# Patient Record
Sex: MALE | Race: WHITE
[De-identification: names, ages, dates, MRNs, and addresses within clinical notes are randomized per-mention and may not be internally consistent; named-entity substitution may affect disease eponyms.]

---

## 2019-11-08 ENCOUNTER — HOSPITAL ENCOUNTER (INPATIENT)
Dept: HOSPITAL 74 - YASAS | Age: 54
LOS: 5 days | Discharge: OTHER INSTITUTION | DRG: 773 | End: 2019-11-13
Attending: ALLERGY & IMMUNOLOGY | Admitting: ALLERGY & IMMUNOLOGY
Payer: COMMERCIAL

## 2019-11-08 VITALS — BODY MASS INDEX: 23.1 KG/M2

## 2019-11-08 DIAGNOSIS — F19.24: ICD-10-CM

## 2019-11-08 DIAGNOSIS — F11.20: ICD-10-CM

## 2019-11-08 DIAGNOSIS — F17.213: ICD-10-CM

## 2019-11-08 DIAGNOSIS — F14.90: ICD-10-CM

## 2019-11-08 DIAGNOSIS — Z79.4: ICD-10-CM

## 2019-11-08 DIAGNOSIS — G89.29: ICD-10-CM

## 2019-11-08 DIAGNOSIS — M54.5: ICD-10-CM

## 2019-11-08 DIAGNOSIS — G47.00: ICD-10-CM

## 2019-11-08 DIAGNOSIS — E78.00: ICD-10-CM

## 2019-11-08 DIAGNOSIS — F12.10: ICD-10-CM

## 2019-11-08 DIAGNOSIS — Z79.84: ICD-10-CM

## 2019-11-08 DIAGNOSIS — R00.0: ICD-10-CM

## 2019-11-08 DIAGNOSIS — F10.230: Primary | ICD-10-CM

## 2019-11-08 DIAGNOSIS — E11.9: ICD-10-CM

## 2019-11-08 PROCEDURE — HZ2ZZZZ DETOXIFICATION SERVICES FOR SUBSTANCE ABUSE TREATMENT: ICD-10-PCS | Performed by: ALLERGY & IMMUNOLOGY

## 2019-11-08 RX ADMIN — ASPIRIN 81 MG SCH MG: 81 TABLET ORAL at 19:31

## 2019-11-08 RX ADMIN — INSULIN ASPART SCH: 100 INJECTION, SOLUTION INTRAVENOUS; SUBCUTANEOUS at 19:31

## 2019-11-08 RX ADMIN — INSULIN ASPART SCH: 100 INJECTION, SOLUTION INTRAVENOUS; SUBCUTANEOUS at 22:14

## 2019-11-08 RX ADMIN — ATORVASTATIN CALCIUM SCH MG: 80 TABLET, FILM COATED ORAL at 22:14

## 2019-11-08 RX ADMIN — METFORMIN HYDROCHLORIDE SCH MG: 500 TABLET ORAL at 19:31

## 2019-11-08 RX ADMIN — INSULIN DETEMIR SCH: 100 INJECTION, SOLUTION SUBCUTANEOUS at 22:17

## 2019-11-08 RX ADMIN — Medication SCH MG: at 22:14

## 2019-11-08 NOTE — HP
CIWA Score


Nausea/Vomitin


Muscle Tremors: 2


Anxiety: 4-Mod. Anxious/Guarded


Agitation: 4-Moderately Restless


Paroxysmal Sweats: 2


Orientation: 0-Oriented


Tacttile Disturbances: 0-None


Auditory Disturbances: 0-None


Visual Disturbances: 0-None


Headache: 0-None Present


CIWA-Ar Total Score: 14





- Admission Criteria


[facility] Guidelines: Admission for Medically Managed Detox: 


Requires at least one of the followin. CIWA greater than 12


2. Seizures within the past 24 hours


3. Delirium tremens within the past 24 hours


4. Hallucinations within the past 24 hours


5. Acute intervention needed for co  occurring medical disorder


6. Acute intervention needed for co  occurring psychiatric disorder


7. Severe withdrawal that cannot be handled at a lower level of care (continued


    vomiting, continued diarrhea, abnormal vital signs) requiring intravenous


    medication and/or fluids


8. Pregnancy








Admission ROS S





- HPI


Allergies/Adverse Reactions: 


 Allergies











Allergy/AdvReac Type Severity Reaction Status Date / Time


 


No Known Allergies Allergy   Verified 19 14:06











History of Present Illness: 





pt here requesting detox from etoh use  , reports 2  pints/day , 12-18 beers/

day , starts drinking in the morning , latest use today , started 17 mo ago  2/

2 family issues.  


heroin : relapsed 17  mo ago, latest  use  1 week ago , was started  on 

Suboxone yesterday  . denies IV use   currently  used  IV  in the 's and  

[id number]


cocaine : 1 gr  q 2  days  denies iv use 


cannabis : 1/8  every 3  days  


tobacco :  1  ppd  


pmhx :DM  , denies Hep C  / HIV  


shx :  unemployed, lives alone  , denies  legal issues 








 This report was requested by: Roula Valdez | Reference #: [id number]


Others' Prescriptions


Patient Name:    Alexis hSen    YOB: 1965


Address:    [address] [place] [address]    Sex:    Male


Rx Written    Rx Dispensed    Drug    Quantity    Days Supply    Prescriber Name


2019    buprenorphine-naloxone 8-2 mg sl film    24    8    

Mary Warner (NP)


10/17/2019    10/17/2019    zolpidem tartrate 10 mg tablet    28    28    Salop

, Shanyq, G


10/17/2019    10/17/2019    clonazepam 1 mg tablet    28    28    Salop, Shanyq

, G


2019    zolpidem tartrate 10 mg tablet    28    28    

MishaHector (NP)


2019    clonazepam 1 mg tablet    28    28    [facility] 

Hector BYRNE (NP)


2019    zolpidem tartrate 10 mg tablet    28    28    

[facility]


2019    [facility]on[facility] 1 mg tablet    28    [facility]


2019    05/15/2019    zolpidem tartrate 10 mg tablet    28    28    

Mariia Telles MD


2019    clonazepam 1 mg tablet    28    28    Mariia Telles MD


2019    04/15/2019    zolpidem tartrate 10 mg tablet    30    30    

MishaHector (NP)


2019    clonazepam 0.5 mg tablet    28    28    [facility]


03/15/2019    03/15/2019    zolpidem tartrate 10 mg tablet    30    30    

TigenogenaroHector (NP)


2019    zolpidem tartrate 10 mg tablet    30    30    

Mariia Telles MD


01/15/2019    01/15/2019    zolpidem tartrate 10 mg tablet    30    30    

Mariia Telles MD


2018    zolpidem tartrate 10 mg tablet    30    30    

Mariia Telles MD





Patient Name:    Alexis Shen Jr    YOB: 1965


Address:    [address] [place] [address]    Sex:    Male


Rx Written    Rx Dispensed    Drug    Quantity    Days Supply    Prescriber Name


2019    oxycodone-acetaminophen 5-325 mg tab    56    7    

Aris Douglas


2019    oxycodone-acetaminophen 5-325 mg tab    56    8    

Aris Douglas


2019    oxycodone-acetaminophen 5-325 mg tab    21    7    

Aris Douglas


2019    oxycodone-acetaminophen  mg tab    45    15 

   Aris Douglas


2019    oxycodone-acetaminophen  mg tab    45    15 

   Aris Douglsa


2019    oxycodone-acetaminophen 5-325 mg tab    21    7    

Aris Douglas


2018    oxycodone-acetaminophen  mg tab    45    15 

   Aris Douglas


2018    oxycodone-acetaminophen  mg tab    21    7    

Aris Douglas


Exam Limitations: Clinical Condition





- Ebola screening


Have you traveled outside of the country in the last 21 days: No (N)


Have you had contact with anyone from an Ebola affected area: No


Do you have a fever: No





- Review of Systems


Constitutional: See HPI, Loss of Appetite


EENT: reports: Other (reading glasses)


Respiratory: reports: No Symptoms reported


Cardiac: reports: No Symptoms Reported


GI: reports: See HPI, Constipated, Nausea, Poor Appetite


: reports: No Symptoms Reported


Musculoskeletal: reports: Back Pain (chronic  - used to have rx for Perocet " 

it was for my SSI "), Joint Pain (erik knees  , feet  chronic pain)


Integumentary: reports: No Symptoms Reported


Neuro: reports: Numbness (DM neuropathy), Unsteady Gait (using cane)


Endocrine: reports: See HPI


Psychiatric: reports: Orientated x3, Agitated, Anxious





Patient History





- Smoking Cessation


Smoking history: Current every day smoker


Have you smoked in the past 12 months: Yes


Hx Chewing Tobacco Use: No


Initiated information on smoking cessation: Yes


'Breaking Loose' booklet given: 19





- Substances abused


  ** Heroin


Substance route: Inhalation


Frequency: Daily


Amount used: 2BUNDLES


Age of first use: 20


Date of last use: 19





  ** Crack


Substance route: Smoking


Frequency: Daily


Amount used: 2BUNDLES


Age of first use: 22


Date of last use: 19





  ** Alcohol


Substance route: Oral


Frequency: Daily


Amount used: BEERS- 12PCK/ BACARDY- 1L


Age of first use: 10


Date of last use: 19





Admission Physical Exam [facility]





- Vital Signs


Vital Signs: 


 Vital Signs - 24 hr











  19





  14:11


 


Temperature 97.9 F


 


Pulse Rate 123 H


 


Respiratory 20





Rate 


 


Blood Pressure 163/96














- Physical


General Appearance: Yes: Mild Distress, Anxious


HEENTM: Yes: EOMI, Hearing grossly Normal, Normocephalic, Normal Voice


Respiratory: Yes: Chest Non-Tender, Lungs Clear, Normal Breath Sounds, No 

Respiratory Distress, No Accessory Muscle Use


Neck: Yes: No masses,lesions,Nodules, Trachea in good position


Cardiology: Yes: Regular Rhythm, Regular Rate, S1, S2, Tachycardia


Abdominal: Yes: Non Tender, Soft


Musculoskeletal: Yes: Back pain


Extremities: Yes: Normal Range of Motion, Non-Tender, Tremors


Neurological: Yes: Fully Oriented, Alert, Motor Strength 5/5


Integumentary: Yes: Warm, Track Marks





- Diagnostic


(1) Opioid dependence on agonist therapy


Current Visit: Yes   Status: Acute   





(2) Alcohol use disorder


Current Visit: Yes   Status: Chronic   





(3) Nicotine dependence


Current Visit: Yes   Status: Chronic   


Qualifiers: 


   Nicotine product type: cigarettes 





(4) Cocaine use


Current Visit: Yes   Status: Chronic   





(5) Cannabis use disorder, mild, abuse


Current Visit: Yes   Status: Chronic   





Breathalyzer





- Breathalyzer


Breathalyzer: 0





Urine Drug Screen





- Test Device


Lot number: [id number]


Expiration date: 21





- Control


Is test valid?: Yes





- Results


Drug screen NEGATIVE: No


Urine drug screen results: THC-Marijuana, BRANDY-Cocaine, BUP-Suboxone





Inpatient Rehab Admission





- Rehab Decision to Admit


Inpatient rehab admission?: No

## 2019-11-09 LAB
ALBUMIN SERPL-MCNC: 3.6 G/DL (ref 3.4–5)
ALP SERPL-CCNC: 72 U/L (ref 45–117)
ALT SERPL-CCNC: 14 U/L (ref 13–61)
ANION GAP SERPL CALC-SCNC: 6 MMOL/L (ref 8–16)
AST SERPL-CCNC: 14 U/L (ref 15–37)
BILIRUB SERPL-MCNC: 0.4 MG/DL (ref 0.2–1)
BUN SERPL-MCNC: 19.4 MG/DL (ref 7–18)
CALCIUM SERPL-MCNC: 9 MG/DL (ref 8.5–10.1)
CHLORIDE SERPL-SCNC: 102 MMOL/L (ref 98–107)
CO2 SERPL-SCNC: 34 MMOL/L (ref 21–32)
CREAT SERPL-MCNC: 0.8 MG/DL (ref 0.55–1.3)
DEPRECATED RDW RBC AUTO: 13.6 % (ref 11.9–15.9)
GLUCOSE SERPL-MCNC: 139 MG/DL (ref 74–106)
HCT VFR BLD CALC: 40.9 % (ref 35.4–49)
HGB BLD-MCNC: 13.7 GM/DL (ref 11.7–16.9)
MCH RBC QN AUTO: 30.4 PG (ref 25.7–33.7)
MCHC RBC AUTO-ENTMCNC: 33.6 G/DL (ref 32–35.9)
MCV RBC: 90.4 FL (ref 80–96)
PLATELET # BLD AUTO: 185 K/MM3 (ref 134–434)
PMV BLD: 8.3 FL (ref 7.5–11.1)
POTASSIUM SERPLBLD-SCNC: 3.8 MMOL/L (ref 3.5–5.1)
PROT SERPL-MCNC: 6.9 G/DL (ref 6.4–8.2)
RBC # BLD AUTO: 4.52 M/MM3 (ref 4–5.6)
SODIUM SERPL-SCNC: 142 MMOL/L (ref 136–145)
WBC # BLD AUTO: 9.6 K/MM3 (ref 4–10)

## 2019-11-09 RX ADMIN — INSULIN ASPART SCH: 100 INJECTION, SOLUTION INTRAVENOUS; SUBCUTANEOUS at 06:49

## 2019-11-09 RX ADMIN — FLUOXETINE SCH MG: 10 TABLET, FILM COATED ORAL at 13:09

## 2019-11-09 RX ADMIN — ATORVASTATIN CALCIUM SCH MG: 80 TABLET, FILM COATED ORAL at 22:28

## 2019-11-09 RX ADMIN — INSULIN ASPART SCH: 100 INJECTION, SOLUTION INTRAVENOUS; SUBCUTANEOUS at 16:58

## 2019-11-09 RX ADMIN — Medication PRN MG: at 22:29

## 2019-11-09 RX ADMIN — Medication SCH TAB: at 10:58

## 2019-11-09 RX ADMIN — GLIPIZIDE SCH MG: 5 TABLET, EXTENDED RELEASE ORAL at 07:06

## 2019-11-09 RX ADMIN — Medication SCH MG: at 22:28

## 2019-11-09 RX ADMIN — INSULIN DETEMIR SCH: 100 INJECTION, SOLUTION SUBCUTANEOUS at 22:49

## 2019-11-09 RX ADMIN — METFORMIN HYDROCHLORIDE SCH MG: 500 TABLET ORAL at 07:06

## 2019-11-09 RX ADMIN — INSULIN ASPART SCH: 100 INJECTION, SOLUTION INTRAVENOUS; SUBCUTANEOUS at 11:04

## 2019-11-09 RX ADMIN — BUPRENORPHINE HYDROCHLORIDE, NALOXONE HYDROCHLORIDE SCH EACH: 8; 2 FILM, SOLUBLE BUCCAL; SUBLINGUAL at 10:59

## 2019-11-09 RX ADMIN — ARIPIPRAZOLE SCH MG: 5 TABLET ORAL at 13:10

## 2019-11-09 RX ADMIN — ASPIRIN 81 MG SCH MG: 81 TABLET ORAL at 10:58

## 2019-11-09 RX ADMIN — INSULIN ASPART SCH: 100 INJECTION, SOLUTION INTRAVENOUS; SUBCUTANEOUS at 22:49

## 2019-11-09 NOTE — CONSULT
[facility] Psychiatric Consult





- Data


Date of interview: 11/09/19


Admission source: [facility]


Identifying data: First admission to [facility] for this 53 y/o  male 

self-referred for detoxification (SOLITARIO issues : alcohol, cannabis, nicotine, 

cocaine). Interviewed at [address] [place] [address]. Patient is , father of one, domiciled

, unemployed and supported on SSI benefits.


Substance Abuse History: Discussed with the patient. Details in current [facility]HS 

report as follows : Smoking history: Current every day smoker.  Have you smoked 

in the past 12 months: Yes.  Hx Chewing Tobacco Use: No.  Initiated information 

on smoking cessation: Yes.  'Breaking Loose' booklet given: 11/08/19.  - 

Substances abused.  ** Heroin.  Substance route: Inhalation.  Frequency: Daily.

  Amount used: 2BUNDLES.  Age of first use: 20.  Date of last use: 11/01/19.  *

* Crack.  Substance route: Smoking.  Frequency: Daily.  Amount used: 2BUNDLES.  

Age of first use: 22.  Date of last use: 11/07/19.  ** Alcohol.  Substance route

: Oral.  Frequency: Daily.  Amount used: BEERS- 12PCK/ BACARDY- 1L.  Age of 

first use: 10.  Date of last use: 11/08/19


Medical History: Medical profile is consistent with diabetes mellitus, 

hypercholesterolemia, chronic lumbar pain and history of surgeries (right 

inguinal herniorraphy + left wrist surgery).


Psychiatric History: Patient denies history of psychiatric hospitalizations. He 

indicates that he has been diagnosed with " bipolar disorder and schizophrenia 

". Mr Shen sees a psychiatrist at [facility] in the [place] 

for his OPD care. Medicated with prozac + zolpidem + aripriprazole. Currently 

on suboxone maintenance. No reported history of suicide attempts.


Physical/Sexual Abuse/Trauma History: None reported.


Additional Comment: Urine drug screen results: THC-Marijuana, BRANDY-Cocaine, BUP-

Suboxone. Noted.





Mental Status Exam





- Mental Status Exam


Alert and Oriented to: Time, Place, Person


Cognitive Function: Good


Patient Appearance: Well Groomed


Mood: Hopeful


Affect: Appropriate, Normal Range


Patient Behavior: Fatigued, Appropriate, Cooperative


Speech Pattern: Clear, Appropriate


Thought Process: Intact, Goal Oriented


Thought Disorder: Not Present


Hallucinations: Denies


Suicidal Ideation: Denies


Homicidal Ideation: Denies


Insight/Judgement: Poor


Sleep: Poorly, Difficulty falling asleep


Appetite: Good


Muscle strength/Tone: Normal


Gait/Station: Normal





Psychiatric Findings





- Problem List (Axis 1, 2,3)


(1) Alcohol use disorder


Current Visit: Yes   Status: Chronic   





(2) Opioid dependence on agonist therapy


Current Visit: Yes   Status: Chronic   





(3) Cannabis use disorder, mild, abuse


Current Visit: Yes   Status: Chronic   





(4) Cocaine use


Current Visit: Yes   Status: Chronic   





(5) Nicotine dependence


Current Visit: Yes   Status: Chronic   


Qualifiers: 


   Nicotine product type: cigarettes 





(6) Substance induced mood disorder


Current Visit: Yes   Status: Chronic   





(7) History of schizoaffective disorder


Current Visit: Yes   Status: Chronic   





(8) Insomnia


Current Visit: Yes   Status: Chronic   





- Initial Treatment Plan


Initial Treatment Plan: Psychoeducation. Sleep hygiene. Detoxification. AA/NA 

meetings. MAT services discussed in this session. Groups. Medications resumed 

as : prozac 10 mg po daily + abilify 5 mg po daily. Insomnia is addressed with 

melatonin at bedtime. Side effects/benefits of each formulation are discussed 

with patient. Gave verbal consent to this plan of care. Observation.

## 2019-11-09 NOTE — PN
S CIWA





- CIWA Score


Nausea/Vomitin-No Nausea/No Vomiting


Muscle Tremors: 2


Anxiety: 3


Agitation: 0-Normal Activity


Paroxysmal Sweats: 3


Orientation: 0-Oriented


Tacttile Disturbances: 0-None


Auditory Disturbances: 0-None


Visual Disturbances: 0-None


Headache: 1-Very Mild


CIWA-Ar Total Score: 9





B[facility] Progress Note (SOAP)


Subjective: 





c/o headache, anxiety, and sweats.


Objective: 





19 13:35


 Vital Signs











  19





  06:16 09:38 11:07


 


Temperature 97 F L 96.5 F L 


 


Pulse Rate 76 76 85


 


Respiratory 18 18 





Rate   


 


Blood Pressure 93/60 95/55 L 112/64














  19





  13:19


 


Temperature 97.2 F L


 


Pulse Rate 83


 


Respiratory 18





Rate 


 


Blood Pressure 102/62








 Lab Results











WBC  9.6 K/mm3 (4.0-10.0)   19  07:50    


 


RBC  4.52 M/mm3 (4.00-5.60)   19  07:50    


 


Hgb  13.7 GM/dL (11.7-16.9)   19  07:50    


 


Hct  40.9 % (35.4-49)   19  07:50    


 


MCV  90.4 fl (80-96)   19  07:50    


 


MCHC  33.6 g/dl (32.0-35.9)   19  07:50    


 


RDW  13.6 % (11.9-15.9)   19  07:50    


 


Plt Count  185 K/MM3 (134-434)   19  07:50    


 


Sodium  142 mmol/L (136-145)   19  07:50    


 


Potassium  3.8 mmol/L (3.5-5.1)   19  07:50    


 


Chloride  102 mmol/L ()   19  07:50    


 


Carbon Dioxide  34 mmol/L (21-32)  H  19  07:50    


 


Anion Gap  6 MMOL/L (8-16)  L  19  07:50    


 


BUN  19.4 mg/dL (7-18)  H  19  07:50    


 


Creatinine  0.8 mg/dL (0.55-1.3)   19  07:50    


 


Random Glucose  139 mg/dL ()  H  19  07:50    


 


Calcium  9.0 mg/dL (8.5-10.1)   19  07:50    








Labs noted.


Assessment: 





19 13:35


AOX3, in no acute respiratory distress.


Full rom, ambulating in the unit.


Withdrawal symptoms.


Plan: 





continue detox.

## 2019-11-10 RX ADMIN — INSULIN ASPART SCH: 100 INJECTION, SOLUTION INTRAVENOUS; SUBCUTANEOUS at 22:16

## 2019-11-10 RX ADMIN — ATORVASTATIN CALCIUM SCH MG: 80 TABLET, FILM COATED ORAL at 22:14

## 2019-11-10 RX ADMIN — INSULIN ASPART SCH: 100 INJECTION, SOLUTION INTRAVENOUS; SUBCUTANEOUS at 06:34

## 2019-11-10 RX ADMIN — INSULIN ASPART SCH: 100 INJECTION, SOLUTION INTRAVENOUS; SUBCUTANEOUS at 12:04

## 2019-11-10 RX ADMIN — METFORMIN HYDROCHLORIDE SCH MG: 500 TABLET ORAL at 06:33

## 2019-11-10 RX ADMIN — GLIPIZIDE SCH MG: 5 TABLET, EXTENDED RELEASE ORAL at 06:34

## 2019-11-10 RX ADMIN — ARIPIPRAZOLE SCH MG: 5 TABLET ORAL at 10:21

## 2019-11-10 RX ADMIN — FLUOXETINE SCH MG: 10 TABLET, FILM COATED ORAL at 10:21

## 2019-11-10 RX ADMIN — ASPIRIN 81 MG SCH MG: 81 TABLET ORAL at 10:20

## 2019-11-10 RX ADMIN — INSULIN DETEMIR SCH: 100 INJECTION, SOLUTION SUBCUTANEOUS at 22:17

## 2019-11-10 RX ADMIN — BUPRENORPHINE HYDROCHLORIDE, NALOXONE HYDROCHLORIDE SCH EACH: 8; 2 FILM, SOLUBLE BUCCAL; SUBLINGUAL at 10:20

## 2019-11-10 RX ADMIN — Medication SCH TAB: at 10:20

## 2019-11-10 RX ADMIN — Medication SCH MG: at 22:14

## 2019-11-10 RX ADMIN — INSULIN ASPART SCH: 100 INJECTION, SOLUTION INTRAVENOUS; SUBCUTANEOUS at 16:43

## 2019-11-10 NOTE — PN
S CIWA





- CIWA Score


Nausea/Vomitin-No Nausea/No Vomiting


Muscle Tremors: 2


Anxiety: 2


Agitation: 2


Paroxysmal Sweats: No Perspiration


Orientation: 0-Oriented


Tacttile Disturbances: 0-None


Auditory Disturbances: 0-None


Visual Disturbances: 0-None


Headache: 0-None Present


CIWA-Ar Total Score: 6





B[facility] Progress Note (SOAP)


Subjective: 





54 years old male admitted on 19 for alcohol withdrawal sx management


treated with librium detox regimen 


taking suboxone 8-2mg po tid last filled 8 days 19


begin suboxone 8-2mg po daily 


patient may return to suboxone provider 


Objective: 





11/10/19 14:06


 Vital Signs











Temperature  99.0 F   11/10/19 13:12


 


Pulse Rate  73   11/10/19 13:12


 


Respiratory Rate  18   11/10/19 13:12


 


Blood Pressure  93/60   11/10/19 13:12


 


O2 Sat by Pulse Oximetry (%)      








 Laboratory Last Values











WBC  9.6 K/mm3 (4.0-10.0)   19  07:50    


 


RBC  4.52 M/mm3 (4.00-5.60)   19  07:50    


 


Hgb  13.7 GM/dL (11.7-16.9)   19  07:50    


 


Hct  40.9 % (35.4-49)   19  07:50    


 


MCV  90.4 fl (80-96)   19  07:50    


 


MCH  30.4 pg (25.7-33.7)   19  07:50    


 


MCHC  33.6 g/dl (32.0-35.9)   19  07:50    


 


RDW  13.6 % (11.9-15.9)   19  07:50    


 


Plt Count  185 K/MM3 (134-434)   19  07:50    


 


MPV  8.3 fl (7.5-11.1)   19  07:50    


 


Sodium  142 mmol/L (136-145)   19  07:50    


 


Potassium  3.8 mmol/L (3.5-5.1)   19  07:50    


 


Chloride  102 mmol/L ()   19  07:50    


 


Carbon Dioxide  34 mmol/L (21-32)  H  19  07:50    


 


Anion Gap  6 MMOL/L (8-16)  L  19  07:50    


 


BUN  19.4 mg/dL (7-18)  H  19  07:50    


 


Creatinine  0.8 mg/dL (0.55-1.3)   19  07:50    


 


Est GFR (CKD-EPI)AfAm  115.74   19  07:50    


 


Est GFR (CKD-EPI)NonAf  99.86   19  07:50    


 


POC Glucometer  126 UNITS ()   11/10/19  12:01    


 


Random Glucose  139 mg/dL ()  H  19  07:50    


 


Calcium  9.0 mg/dL (8.5-10.1)   19  07:50    


 


Total Bilirubin  0.4 mg/dL (0.2-1)   19  07:50    


 


AST  14 U/L (15-37)  L  19  07:50    


 


ALT  14 U/L (13-61)   19  07:50    


 


Alkaline Phosphatase  72 U/L ()   19  07:50    


 


Total Protein  6.9 g/dl (6.4-8.2)   19  07:50    


 


Albumin  3.6 g/dl (3.4-5.0)   19  07:50    


 


RPR Titer  Nonreactive  (NONREACTIVE)   19  07:50    








lab noted 


fasting glucose


Assessment: 





11/10/19 14:07


alcohol withdrawal sx


Plan: 





continue librium detox regimen

## 2019-11-11 RX ADMIN — GLIPIZIDE SCH MG: 5 TABLET, EXTENDED RELEASE ORAL at 06:30

## 2019-11-11 RX ADMIN — ASPIRIN 81 MG SCH MG: 81 TABLET ORAL at 10:11

## 2019-11-11 RX ADMIN — INSULIN ASPART SCH: 100 INJECTION, SOLUTION INTRAVENOUS; SUBCUTANEOUS at 06:31

## 2019-11-11 RX ADMIN — BUPRENORPHINE HYDROCHLORIDE, NALOXONE HYDROCHLORIDE SCH EACH: 8; 2 FILM, SOLUBLE BUCCAL; SUBLINGUAL at 10:11

## 2019-11-11 RX ADMIN — FLUOXETINE SCH MG: 10 TABLET, FILM COATED ORAL at 10:15

## 2019-11-11 RX ADMIN — INSULIN ASPART SCH: 100 INJECTION, SOLUTION INTRAVENOUS; SUBCUTANEOUS at 22:27

## 2019-11-11 RX ADMIN — Medication SCH MG: at 22:29

## 2019-11-11 RX ADMIN — Medication SCH TAB: at 10:11

## 2019-11-11 RX ADMIN — ARIPIPRAZOLE SCH MG: 5 TABLET ORAL at 10:15

## 2019-11-11 RX ADMIN — INSULIN DETEMIR SCH: 100 INJECTION, SOLUTION SUBCUTANEOUS at 22:27

## 2019-11-11 RX ADMIN — METFORMIN HYDROCHLORIDE SCH MG: 500 TABLET ORAL at 06:30

## 2019-11-11 RX ADMIN — INSULIN ASPART SCH: 100 INJECTION, SOLUTION INTRAVENOUS; SUBCUTANEOUS at 11:57

## 2019-11-11 RX ADMIN — INSULIN ASPART SCH: 100 INJECTION, SOLUTION INTRAVENOUS; SUBCUTANEOUS at 17:05

## 2019-11-11 RX ADMIN — ATORVASTATIN CALCIUM SCH MG: 80 TABLET, FILM COATED ORAL at 22:29

## 2019-11-11 NOTE — PN
[facility] Progress Note


Note: 





Psychiatry


Attending's note (follow-up) : 


Patient seen. 


Reason : auditory hallucinations.


Reported by Counselor JARETT Muñiz.


Mr Shen is examined by writer.


" I don't hear voices. This happened a long time ago."


Patient states that he is interested in rehabilitation at [facility].


Feels fine. Adherent to medications. Benign hospital course.


Unremarkable mental status.

## 2019-11-11 NOTE — PN
[facility] CIWA





- CIWA Score


Nausea/Vomitin-No Nausea/No Vomiting


Muscle Tremors: 1-None Visible, but Felt


Anxiety: 1-Mildly Anxious


Agitation: 1-Slight > Activity


Paroxysmal Sweats: No Perspiration


Orientation: 0-Oriented


Tacttile Disturbances: 0-None


Auditory Disturbances: 0-None


Visual Disturbances: 0-None


Headache: 0-None Present


CIWA-Ar Total Score: 3





[facility] Progress Note (SOAP)


Subjective: 





54 years old male admitted on 19 for alcohol withdrawal sx management


treated with librium detox regimen





patient tolerated librium well





ate breakfast ambulating on hallway encourage the patient to attend group and 

meeting


Objective: 





19 14:36


 Laboratory Last Values











WBC  9.6 K/mm3 (4.0-10.0)   19  07:50    


 


RBC  4.52 M/mm3 (4.00-5.60)   19  07:50    


 


Hgb  13.7 GM/dL (11.7-16.9)   19  07:50    


 


Hct  40.9 % (35.4-49)   19  07:50    


 


MCV  90.4 fl (80-96)   19  07:50    


 


MCH  30.4 pg (25.7-33.7)   19  07:50    


 


MCHC  33.6 g/dl (32.0-35.9)   19  07:50    


 


RDW  13.6 % (11.9-15.9)   19  07:50    


 


Plt Count  185 K/MM3 (134-434)   19  07:50    


 


MPV  8.3 fl (7.5-11.1)   19  07:50    


 


Sodium  142 mmol/L (136-145)   19  07:50    


 


Potassium  3.8 mmol/L (3.5-5.1)   19  07:50    


 


Chloride  102 mmol/L ()   19  07:50    


 


Carbon Dioxide  34 mmol/L (21-32)  H  19  07:50    


 


Anion Gap  6 MMOL/L (8-16)  L  19  07:50    


 


BUN  19.4 mg/dL (7-18)  H  19  07:50    


 


Creatinine  0.8 mg/dL (0.55-1.3)   19  07:50    


 


Est GFR (CKD-EPI)AfAm  115.74   19  07:50    


 


Est GFR (CKD-EPI)NonAf  99.86   19  07:50    


 


POC Glucometer  156 UNITS ()   19  11:56    


 


Random Glucose  139 mg/dL ()  H  19  07:50    


 


Fasting Glucose  149 mg/dL ()  H  19  07:50    


 


Calcium  9.0 mg/dL (8.5-10.1)   19  07:50    


 


Total Bilirubin  0.4 mg/dL (0.2-1)   19  07:50    


 


AST  14 U/L (15-37)  L  19  07:50    


 


ALT  14 U/L (13-61)   19  07:50    


 


Alkaline Phosphatase  72 U/L ()   19  07:50    


 


Total Protein  6.9 g/dl (6.4-8.2)   19  07:50    


 


Albumin  3.6 g/dl (3.4-5.0)   19  07:50    


 


RPR Titer  Nonreactive  (NONREACTIVE)   19  07:50    








lab noted


Assessment: 





19 14:37


alcohol withdrawal sx


Plan: 





continue librium detox regimen

## 2019-11-12 RX ADMIN — FLUOXETINE SCH MG: 10 TABLET, FILM COATED ORAL at 10:04

## 2019-11-12 RX ADMIN — Medication SCH TAB: at 10:03

## 2019-11-12 RX ADMIN — METFORMIN HYDROCHLORIDE SCH MG: 500 TABLET ORAL at 06:04

## 2019-11-12 RX ADMIN — GLIPIZIDE SCH MG: 5 TABLET, EXTENDED RELEASE ORAL at 06:05

## 2019-11-12 RX ADMIN — Medication SCH MG: at 21:37

## 2019-11-12 RX ADMIN — Medication PRN MG: at 21:37

## 2019-11-12 RX ADMIN — ASPIRIN 81 MG SCH MG: 81 TABLET ORAL at 10:05

## 2019-11-12 RX ADMIN — ANALGESIC BALM SCH APPLIC: 1.74; 4.06 OINTMENT TOPICAL at 14:06

## 2019-11-12 RX ADMIN — INSULIN ASPART SCH: 100 INJECTION, SOLUTION INTRAVENOUS; SUBCUTANEOUS at 06:14

## 2019-11-12 RX ADMIN — INSULIN ASPART SCH: 100 INJECTION, SOLUTION INTRAVENOUS; SUBCUTANEOUS at 17:28

## 2019-11-12 RX ADMIN — ANALGESIC BALM SCH: 1.74; 4.06 OINTMENT TOPICAL at 22:16

## 2019-11-12 RX ADMIN — INSULIN DETEMIR SCH: 100 INJECTION, SOLUTION SUBCUTANEOUS at 21:37

## 2019-11-12 RX ADMIN — INSULIN ASPART SCH: 100 INJECTION, SOLUTION INTRAVENOUS; SUBCUTANEOUS at 11:53

## 2019-11-12 RX ADMIN — BUPRENORPHINE HYDROCHLORIDE, NALOXONE HYDROCHLORIDE SCH EACH: 8; 2 FILM, SOLUBLE BUCCAL; SUBLINGUAL at 10:03

## 2019-11-12 RX ADMIN — INSULIN ASPART SCH: 100 INJECTION, SOLUTION INTRAVENOUS; SUBCUTANEOUS at 22:16

## 2019-11-12 RX ADMIN — ATORVASTATIN CALCIUM SCH MG: 80 TABLET, FILM COATED ORAL at 21:37

## 2019-11-12 RX ADMIN — ARIPIPRAZOLE SCH MG: 5 TABLET ORAL at 10:03

## 2019-11-12 NOTE — PN
[facility] CIWA





- CIWA Score


Nausea/Vomitin-No Nausea/No Vomiting


Muscle Tremors: None


Anxiety: 1-Mildly Anxious


Agitation: 0-Normal Activity


Paroxysmal Sweats: No Perspiration


Orientation: 0-Oriented


Tacttile Disturbances: 0-None


Auditory Disturbances: 0-None


Visual Disturbances: 0-None


Headache: 0-None Present


CIWA-Ar Total Score: 1





[facility] Progress Note (SOAP)


Subjective: 





54 years old male admitted on 19 for alcohol withdrawal sx management


treated with librium detox regimen





c/o chronic back pain


treated with bengay at home


bengay ordered


Objective: 





19 12:37


 Vital Signs











Temperature  98 F   19 09:03


 


Pulse Rate  81   19 09:03


 


Respiratory Rate  18   19 09:03


 


Blood Pressure  117/79   19 09:03


 


O2 Sat by Pulse Oximetry (%)      








 Laboratory Last Values











WBC  9.6 K/mm3 (4.0-10.0)   19  07:50    


 


RBC  4.52 M/mm3 (4.00-5.60)   19  07:50    


 


Hgb  13.7 GM/dL (11.7-16.9)   19  07:50    


 


Hct  40.9 % (35.4-49)   19  07:50    


 


MCV  90.4 fl (80-96)   19  07:50    


 


MCH  30.4 pg (25.7-33.7)   19  07:50    


 


MCHC  33.6 g/dl (32.0-35.9)   19  07:50    


 


RDW  13.6 % (11.9-15.9)   19  07:50    


 


Plt Count  185 K/MM3 (134-434)   19  07:50    


 


MPV  8.3 fl (7.5-11.1)   19  07:50    


 


Sodium  142 mmol/L (136-145)   19  07:50    


 


Potassium  3.8 mmol/L (3.5-5.1)   19  07:50    


 


Chloride  102 mmol/L ()   19  07:50    


 


Carbon Dioxide  34 mmol/L (21-32)  H  19  07:50    


 


Anion Gap  6 MMOL/L (8-16)  L  19  07:50    


 


BUN  19.4 mg/dL (7-18)  H  19  07:50    


 


Creatinine  0.8 mg/dL (0.55-1.3)   19  07:50    


 


Est GFR (CKD-EPI)AfAm  115.74   19  07:50    


 


Est GFR (CKD-EPI)NonAf  99.86   19  07:50    


 


POC Glucometer  146 UNITS ()   19  11:51    


 


Random Glucose  139 mg/dL ()  H  19  07:50    


 


Fasting Glucose  149 mg/dL ()  H  19  07:50    


 


Calcium  9.0 mg/dL (8.5-10.1)   19  07:50    


 


Total Bilirubin  0.4 mg/dL (0.2-1)   19  07:50    


 


AST  14 U/L (15-37)  L  19  07:50    


 


ALT  14 U/L (13-61)   19  07:50    


 


Alkaline Phosphatase  72 U/L ()   19  07:50    


 


Total Protein  6.9 g/dl (6.4-8.2)   19  07:50    


 


Albumin  3.6 g/dl (3.4-5.0)   19  07:50    


 


RPR Titer  Nonreactive  (NONREACTIVE)   19  07:50    








lab noted


Assessment: 





19 12:38


alcohol withdrawal sx management


Plan: 





continue librium detox regimen

## 2019-11-13 ENCOUNTER — HOSPITAL ENCOUNTER (INPATIENT)
Dept: HOSPITAL 74 - YASAS | Age: 54
LOS: 7 days | Discharge: HOME | DRG: 772 | End: 2019-11-20
Attending: NEUROMUSCULOSKELETAL MEDICINE & OMM | Admitting: NEUROMUSCULOSKELETAL MEDICINE & OMM
Payer: COMMERCIAL

## 2019-11-13 VITALS — SYSTOLIC BLOOD PRESSURE: 105 MMHG | HEART RATE: 88 BPM | TEMPERATURE: 97.1 F | DIASTOLIC BLOOD PRESSURE: 72 MMHG

## 2019-11-13 DIAGNOSIS — F12.10: ICD-10-CM

## 2019-11-13 DIAGNOSIS — F25.9: ICD-10-CM

## 2019-11-13 DIAGNOSIS — F10.20: Primary | ICD-10-CM

## 2019-11-13 DIAGNOSIS — F19.280: ICD-10-CM

## 2019-11-13 DIAGNOSIS — F14.20: ICD-10-CM

## 2019-11-13 DIAGNOSIS — F19.282: ICD-10-CM

## 2019-11-13 DIAGNOSIS — F11.20: ICD-10-CM

## 2019-11-13 DIAGNOSIS — Z62.810: ICD-10-CM

## 2019-11-13 DIAGNOSIS — Z79.84: ICD-10-CM

## 2019-11-13 DIAGNOSIS — F17.210: ICD-10-CM

## 2019-11-13 PROCEDURE — HZ42ZZZ GROUP COUNSELING FOR SUBSTANCE ABUSE TREATMENT, COGNITIVE-BEHAVIORAL: ICD-10-PCS | Performed by: ALLERGY & IMMUNOLOGY

## 2019-11-13 RX ADMIN — INSULIN ASPART SCH: 100 INJECTION, SOLUTION INTRAVENOUS; SUBCUTANEOUS at 06:16

## 2019-11-13 RX ADMIN — Medication PRN MG: at 21:24

## 2019-11-13 RX ADMIN — FLUOXETINE SCH MG: 10 TABLET, FILM COATED ORAL at 10:12

## 2019-11-13 RX ADMIN — ATORVASTATIN CALCIUM SCH MG: 80 TABLET, FILM COATED ORAL at 21:23

## 2019-11-13 RX ADMIN — Medication SCH MG: at 21:24

## 2019-11-13 RX ADMIN — METFORMIN HYDROCHLORIDE SCH MG: 500 TABLET ORAL at 06:16

## 2019-11-13 RX ADMIN — ARIPIPRAZOLE SCH MG: 5 TABLET ORAL at 10:11

## 2019-11-13 RX ADMIN — INSULIN ASPART SCH: 100 INJECTION, SOLUTION INTRAVENOUS; SUBCUTANEOUS at 17:02

## 2019-11-13 RX ADMIN — INSULIN DETEMIR SCH UNITS: 100 INJECTION, SOLUTION SUBCUTANEOUS at 21:25

## 2019-11-13 RX ADMIN — BUPRENORPHINE HYDROCHLORIDE, NALOXONE HYDROCHLORIDE SCH EACH: 8; 2 FILM, SOLUBLE BUCCAL; SUBLINGUAL at 10:11

## 2019-11-13 RX ADMIN — GLIPIZIDE SCH MG: 5 TABLET, EXTENDED RELEASE ORAL at 06:16

## 2019-11-13 RX ADMIN — INSULIN ASPART SCH UNITS: 100 INJECTION, SOLUTION INTRAVENOUS; SUBCUTANEOUS at 21:25

## 2019-11-13 RX ADMIN — Medication SCH TAB: at 10:10

## 2019-11-13 RX ADMIN — ASPIRIN 81 MG SCH MG: 81 TABLET ORAL at 10:10

## 2019-11-13 RX ADMIN — ANALGESIC BALM SCH APPLIC: 1.74; 4.06 OINTMENT TOPICAL at 10:10

## 2019-11-13 RX ADMIN — INSULIN ASPART SCH: 100 INJECTION, SOLUTION INTRAVENOUS; SUBCUTANEOUS at 12:10

## 2019-11-13 NOTE — DS
[facility] Detox Discharge Summary


Admission Date: 


19





Discharge Date: 19





- History


Present History: Alcohol Dependence


Additional Comments: 





54 years old male admitted on 19 for alcohol withdrawal sx management


treated with librium detox regimen


patient is alert oriented x 3





respiratory clear lung bilaterally on auscultation


abdomen soft no rebound tenderness


skin warm and dry





- Physical Exam Results


Vital Signs: 


 Vital Signs











Temperature  97.0 F L  19 09:10


 


Pulse Rate  83   19 09:10


 


Respiratory Rate  16   19 09:10


 


Blood Pressure  111/73   19 09:10


 


O2 Sat by Pulse Oximetry (%)      











Pertinent Admission Physical Exam Findings: 





alcohol withdrawal sx


 Laboratory Last Values











WBC  9.6 K/mm3 (4.0-10.0)   19  07:50    


 


RBC  4.52 M/mm3 (4.00-5.60)   19  07:50    


 


Hgb  13.7 GM/dL (11.7-16.9)   19  07:50    


 


Hct  40.9 % (35.4-49)   19  07:50    


 


MCV  90.4 fl (80-96)   19  07:50    


 


MCH  30.4 pg (25.7-33.7)   19  07:50    


 


MCHC  33.6 g/dl (32.0-35.9)   19  07:50    


 


RDW  13.6 % (11.9-15.9)   19  07:50    


 


Plt Count  185 K/MM3 (134-434)   19  07:50    


 


MPV  8.3 fl (7.5-11.1)   19  07:50    


 


Sodium  142 mmol/L (136-145)   19  07:50    


 


Potassium  3.8 mmol/L (3.5-5.1)   19  07:50    


 


Chloride  102 mmol/L ()   19  07:50    


 


Carbon Dioxide  34 mmol/L (21-32)  H  19  07:50    


 


Anion Gap  6 MMOL/L (8-16)  L  19  07:50    


 


BUN  19.4 mg/dL (7-18)  H  19  07:50    


 


Creatinine  0.8 mg/dL (0.55-1.3)   19  07:50    


 


Est GFR (CKD-EPI)AfAm  115.74   19  07:50    


 


Est GFR (CKD-EPI)NonAf  99.86   19  07:50    


 


POC Glucometer  146 UNITS ()   19  11:59    


 


Random Glucose  139 mg/dL ()  H  19  07:50    


 


Fasting Glucose  149 mg/dL ()  H  19  07:50    


 


Calcium  9.0 mg/dL (8.5-10.1)   19  07:50    


 


Total Bilirubin  0.4 mg/dL (0.2-1)   19  07:50    


 


AST  14 U/L (15-37)  L  19  07:50    


 


ALT  14 U/L (13-61)   19  07:50    


 


Alkaline Phosphatase  72 U/L ()   19  07:50    


 


Total Protein  6.9 g/dl (6.4-8.2)   19  07:50    


 


Albumin  3.6 g/dl (3.4-5.0)   19  07:50    


 


RPR Titer  Nonreactive  (NONREACTIVE)   19  07:50    








lab noted


long history of diabetes 





- Treatment


Hospital Course: Detox Protocol Followed, Detoxed Safely, Responded well, 

Discharged Condition Good, Rehab Referral Accepted


Patient has Accepted [facility] Referral [facility]





- Medication


Discharge Medications: 


Ambulatory Orders





Aripiprazole [Abilify -] 5 mg PO DAILY 19 


Aspirin [ASA -] 81 mg PO DAILY 19 


Atorvastatin Ca [Lipitor] 80 mg PO HS 19 


Buprenorphine/Naloxone [Suboxone 8Mg/2Mg Sl Film -] 1 each SL TID 19 


Fluoxetine HCl [Prozac -] 10 mg PO DAILY 19 


Gabapentin [Neurontin -] 100 mg PO BID 19 


Glipizide [Glipizide ER] 5 mg PO DAILY 19 


Insulin Glargine,Hum.rec.anlog [Basaglar Kwikpen U-100] 40 unit SQ DAILY  


Insulin NPH [Novolin N Vial -] 20 units SQ TID 19 


Metformin HCl [Glucophage] 1,000 mg PO DAILY 19 


Aripiprazole [Abilify -] 5 mg PO DAILY #30 tablet 19 


Fluoxetine HCl [Prozac -] 10 mg PO DAILY #30 capsule 19 











- Diagnosis


(1) Alcohol use disorder


Current Visit: Yes   Status: Acute   





(2) Nicotine dependence


Current Visit: Yes   Status: Acute   


Qualifiers: 


   Nicotine product type: cigarettes   Substance use status: in withdrawal   

Qualified Code(s): F17.213 - Nicotine dependence, cigarettes, with withdrawal   





(3) Substance induced mood disorder


Current Visit: Yes   Status: Suspected   





- AMA


Did Patient Leave Against Medical Advice: No





CIWA Score





- CIWA Score


Nausea/Vomitin-No Nausea/No Vomiting


Muscle Tremors: None


Anxiety: 0-No Anxiety, at Ease


Agitation: 0-Normal Activity


Paroxysmal Sweats: No Perspiration


Orientation: 0-Oriented


Tacttile Disturbances: 0-None


Auditory Disturbances: 0-None


Visual Disturbances: 0-None


Headache: 0-None Present


CIWA-Ar Total Score: 0

## 2019-11-13 NOTE — HP
[facility] MD Rehab Assess/Revision





- Admission History


Admitted to Rehab from: [facility]


Date of Admission to Rehab: 11/13/19





- Findings


Detox History & Physical reviewed: Yes


Concur with findings: Yes


Comments/Additional Findings: transferred from detox to rehab admission as per 

protocol





Inpatient Rehab Admission





- Rehab Decision to Admit


Inpatient rehab admission?: Yes





- Initial Determination


Are CD services needed?: Yes


Free of communicable disease: Yes


Not in need of hospitalization: Yes





- Rehab Admission Criteria


Previous failed treatment: Yes


Poor recovery environment: Yes


Comorbidities: Yes


Lacks judgement: Yes


Patient is meeting Inpatient Rehab admission criteria:: Yes

## 2019-11-14 RX ADMIN — BUPRENORPHINE HYDROCHLORIDE, NALOXONE HYDROCHLORIDE SCH EACH: 8; 2 FILM, SOLUBLE BUCCAL; SUBLINGUAL at 10:19

## 2019-11-14 RX ADMIN — ASPIRIN 81 MG SCH MG: 81 TABLET ORAL at 10:19

## 2019-11-14 RX ADMIN — ATORVASTATIN CALCIUM SCH MG: 80 TABLET, FILM COATED ORAL at 21:17

## 2019-11-14 RX ADMIN — GLIPIZIDE SCH MG: 5 TABLET, EXTENDED RELEASE ORAL at 07:26

## 2019-11-14 RX ADMIN — ARIPIPRAZOLE SCH MG: 5 TABLET ORAL at 10:18

## 2019-11-14 RX ADMIN — Medication SCH MG: at 21:17

## 2019-11-14 RX ADMIN — INSULIN ASPART SCH: 100 INJECTION, SOLUTION INTRAVENOUS; SUBCUTANEOUS at 10:35

## 2019-11-14 RX ADMIN — Medication PRN MG: at 21:17

## 2019-11-14 RX ADMIN — INSULIN DETEMIR SCH UNITS: 100 INJECTION, SOLUTION SUBCUTANEOUS at 21:16

## 2019-11-14 RX ADMIN — INSULIN ASPART SCH: 100 INJECTION, SOLUTION INTRAVENOUS; SUBCUTANEOUS at 07:26

## 2019-11-14 RX ADMIN — Medication SCH TAB: at 10:18

## 2019-11-14 RX ADMIN — FLUOXETINE SCH MG: 10 CAPSULE ORAL at 10:19

## 2019-11-14 RX ADMIN — METFORMIN HYDROCHLORIDE SCH MG: 500 TABLET ORAL at 07:26

## 2019-11-15 RX ADMIN — Medication SCH TAB: at 09:45

## 2019-11-15 RX ADMIN — Medication SCH MG: at 21:18

## 2019-11-15 RX ADMIN — GLIPIZIDE SCH MG: 5 TABLET, EXTENDED RELEASE ORAL at 07:30

## 2019-11-15 RX ADMIN — FLUOXETINE SCH MG: 10 CAPSULE ORAL at 09:44

## 2019-11-15 RX ADMIN — INSULIN DETEMIR SCH: 100 INJECTION, SOLUTION SUBCUTANEOUS at 21:21

## 2019-11-15 RX ADMIN — ASPIRIN 81 MG SCH MG: 81 TABLET ORAL at 09:44

## 2019-11-15 RX ADMIN — ANALGESIC BALM SCH APPLIC: 1.74; 4.06 OINTMENT TOPICAL at 22:24

## 2019-11-15 RX ADMIN — BUPRENORPHINE HYDROCHLORIDE, NALOXONE HYDROCHLORIDE SCH EACH: 8; 2 FILM, SOLUBLE BUCCAL; SUBLINGUAL at 09:47

## 2019-11-15 RX ADMIN — METFORMIN HYDROCHLORIDE SCH MG: 500 TABLET ORAL at 07:30

## 2019-11-15 RX ADMIN — INSULIN ASPART SCH: 100 INJECTION, SOLUTION INTRAVENOUS; SUBCUTANEOUS at 06:39

## 2019-11-15 RX ADMIN — ATORVASTATIN CALCIUM SCH MG: 80 TABLET, FILM COATED ORAL at 21:19

## 2019-11-15 RX ADMIN — ARIPIPRAZOLE SCH MG: 5 TABLET ORAL at 09:44
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
## 2019-11-16 RX ADMIN — ASPIRIN 81 MG SCH MG: 81 TABLET ORAL at 09:37

## 2019-11-16 RX ADMIN — Medication SCH TAB: at 09:37

## 2019-11-16 RX ADMIN — ANALGESIC BALM SCH: 1.74; 4.06 OINTMENT TOPICAL at 09:37

## 2019-11-16 RX ADMIN — METFORMIN HYDROCHLORIDE SCH MG: 500 TABLET ORAL at 06:44

## 2019-11-16 RX ADMIN — ARIPIPRAZOLE SCH MG: 5 TABLET ORAL at 09:36

## 2019-11-16 RX ADMIN — INSULIN ASPART SCH: 100 INJECTION, SOLUTION INTRAVENOUS; SUBCUTANEOUS at 06:44

## 2019-11-16 RX ADMIN — INSULIN DETEMIR SCH UNITS: 100 INJECTION, SOLUTION SUBCUTANEOUS at 21:24

## 2019-11-16 RX ADMIN — BUPRENORPHINE HYDROCHLORIDE, NALOXONE HYDROCHLORIDE SCH EACH: 8; 2 FILM, SOLUBLE BUCCAL; SUBLINGUAL at 09:37

## 2019-11-16 RX ADMIN — ANALGESIC BALM SCH APPLIC: 1.74; 4.06 OINTMENT TOPICAL at 21:22

## 2019-11-16 RX ADMIN — Medication SCH MG: at 21:21

## 2019-11-16 RX ADMIN — FLUOXETINE SCH MG: 10 CAPSULE ORAL at 09:37

## 2019-11-16 RX ADMIN — ATORVASTATIN CALCIUM SCH MG: 80 TABLET, FILM COATED ORAL at 21:21

## 2019-11-16 RX ADMIN — GLIPIZIDE SCH MG: 5 TABLET, EXTENDED RELEASE ORAL at 06:44

## 2019-11-17 RX ADMIN — BUPRENORPHINE HYDROCHLORIDE, NALOXONE HYDROCHLORIDE SCH EACH: 8; 2 FILM, SOLUBLE BUCCAL; SUBLINGUAL at 10:24

## 2019-11-17 RX ADMIN — Medication SCH TAB: at 10:24

## 2019-11-17 RX ADMIN — GLIPIZIDE SCH MG: 5 TABLET, EXTENDED RELEASE ORAL at 06:34

## 2019-11-17 RX ADMIN — INSULIN DETEMIR SCH UNITS: 100 INJECTION, SOLUTION SUBCUTANEOUS at 21:00

## 2019-11-17 RX ADMIN — ANALGESIC BALM SCH APPLIC: 1.74; 4.06 OINTMENT TOPICAL at 10:24

## 2019-11-17 RX ADMIN — METFORMIN HYDROCHLORIDE SCH MG: 500 TABLET ORAL at 06:34

## 2019-11-17 RX ADMIN — ARIPIPRAZOLE SCH MG: 5 TABLET ORAL at 10:24

## 2019-11-17 RX ADMIN — FLUOXETINE SCH MG: 10 CAPSULE ORAL at 10:24

## 2019-11-17 RX ADMIN — ASPIRIN 81 MG SCH MG: 81 TABLET ORAL at 10:24

## 2019-11-17 RX ADMIN — INSULIN ASPART SCH: 100 INJECTION, SOLUTION INTRAVENOUS; SUBCUTANEOUS at 06:33

## 2019-11-17 RX ADMIN — ANALGESIC BALM SCH: 1.74; 4.06 OINTMENT TOPICAL at 21:00

## 2019-11-17 RX ADMIN — ATORVASTATIN CALCIUM SCH MG: 80 TABLET, FILM COATED ORAL at 21:00

## 2019-11-17 RX ADMIN — Medication SCH MG: at 21:00

## 2019-11-18 RX ADMIN — ANALGESIC BALM SCH APPLIC: 1.74; 4.06 OINTMENT TOPICAL at 22:11

## 2019-11-18 RX ADMIN — Medication SCH TAB: at 09:48

## 2019-11-18 RX ADMIN — INSULIN DETEMIR SCH UNITS: 100 INJECTION, SOLUTION SUBCUTANEOUS at 21:34

## 2019-11-18 RX ADMIN — ATORVASTATIN CALCIUM SCH MG: 80 TABLET, FILM COATED ORAL at 21:30

## 2019-11-18 RX ADMIN — FLUOXETINE SCH MG: 10 CAPSULE ORAL at 09:48

## 2019-11-18 RX ADMIN — BUPRENORPHINE HYDROCHLORIDE, NALOXONE HYDROCHLORIDE SCH EACH: 8; 2 FILM, SOLUBLE BUCCAL; SUBLINGUAL at 09:48

## 2019-11-18 RX ADMIN — INSULIN ASPART SCH: 100 INJECTION, SOLUTION INTRAVENOUS; SUBCUTANEOUS at 06:23

## 2019-11-18 RX ADMIN — ARIPIPRAZOLE SCH MG: 5 TABLET ORAL at 09:48

## 2019-11-18 RX ADMIN — Medication SCH MG: at 21:30

## 2019-11-18 RX ADMIN — ASPIRIN 81 MG SCH MG: 81 TABLET ORAL at 09:48

## 2019-11-18 RX ADMIN — ANALGESIC BALM SCH APPLIC: 1.74; 4.06 OINTMENT TOPICAL at 09:49

## 2019-11-18 RX ADMIN — METFORMIN HYDROCHLORIDE SCH MG: 500 TABLET ORAL at 06:23

## 2019-11-18 RX ADMIN — GLIPIZIDE SCH MG: 5 TABLET, EXTENDED RELEASE ORAL at 06:23

## 2019-11-19 RX ADMIN — FLUOXETINE SCH MG: 10 CAPSULE ORAL at 09:40

## 2019-11-19 RX ADMIN — ATORVASTATIN CALCIUM SCH MG: 80 TABLET, FILM COATED ORAL at 21:17

## 2019-11-19 RX ADMIN — INSULIN DETEMIR SCH UNITS: 100 INJECTION, SOLUTION SUBCUTANEOUS at 21:16

## 2019-11-19 RX ADMIN — ANALGESIC BALM SCH: 1.74; 4.06 OINTMENT TOPICAL at 21:17

## 2019-11-19 RX ADMIN — METFORMIN HYDROCHLORIDE SCH MG: 500 TABLET ORAL at 06:15

## 2019-11-19 RX ADMIN — ASPIRIN 81 MG SCH MG: 81 TABLET ORAL at 09:40

## 2019-11-19 RX ADMIN — BUPRENORPHINE HYDROCHLORIDE, NALOXONE HYDROCHLORIDE SCH EACH: 8; 2 FILM, SOLUBLE BUCCAL; SUBLINGUAL at 09:40

## 2019-11-19 RX ADMIN — ANALGESIC BALM SCH: 1.74; 4.06 OINTMENT TOPICAL at 09:41

## 2019-11-19 RX ADMIN — ARIPIPRAZOLE SCH MG: 5 TABLET ORAL at 09:40

## 2019-11-19 RX ADMIN — GLIPIZIDE SCH MG: 5 TABLET, EXTENDED RELEASE ORAL at 06:15

## 2019-11-19 RX ADMIN — Medication SCH TAB: at 09:40

## 2019-11-19 RX ADMIN — Medication SCH MG: at 21:16

## 2019-11-19 RX ADMIN — INSULIN ASPART SCH: 100 INJECTION, SOLUTION INTRAVENOUS; SUBCUTANEOUS at 06:16

## 2019-11-19 NOTE — PN
[facility] Progress Note


Note: 





Patient is scheduled for discharge tomorrow. Scripts for 30 days supply of 

medications(Abilify 5 mg/day, Prozac 10 mg/day) will be electronically 

transmitted to [facility] at [address] [place] [address]

## 2019-11-19 NOTE — DS
[facility] Rehab Discharge Summary





- [facility] Rehab Discharge Summary


Admission Date: 11/13/19


Discharge Date: 11/20/19





- History


Present History: Alcohol dependence, Cocaine dependence


Pertinent Past History: 


History of Present Illness: 


etoh use 2  pints/day , 12-18 beers/day , starts drinking in the morning, 

started 17 mo ago  2/2 family issues.  


heroin : relapsed 17  mo ago, started  on Suboxone yesterday; denies IV use, 

but used  IV  in the 1980's and  [id number]


cocaine : 1 gr  q 2  days  denies iv use 


cannabis : 1/8  every 3  days  


tobacco :  1  ppd  


pmhx :DM  , denies Hep C  / HIV  


shx :  unemployed, lives alone  , denies  legal issues 








- Discharge Physical Exam


Vital Signs: 


 Vital Signs











Temperature  97.4 F L  11/19/19 07:07


 


Pulse Rate  82   11/19/19 07:07


 


Respiratory Rate  18   11/19/19 07:07


 


Blood Pressure  110/61   11/19/19 07:07


 


O2 Sat by Pulse Oximetry (%)      











Pertinent Admission Physical Exam Findings: 





- Physical


General Appearance:  No apparent distress


HEENTM: Normocephalic,


Respiratory: Lungs Clear, 


Neck:   supple, Trachea in good position


Cardiology:  S1, S2, 


Abdominal:   +BSNon Tender, Soft


MSK: Full weight bearing, full ROM, steady gait.


Neurological:  CN 2-12 intact, Motor Strength 5/5








- Treatment


Discharge Condition: Outpatient referral accepted (Patient will go to 

[facility]. Medically stable for discharge.)


Hospital Course: 


Patient attended groups, had 1:1 meetings with his counselor, was adherent to 

his treatment plan and his medication regimen.





- Medication


Discharge Medications: 


Ambulatory Orders





Aspirin [ASA -] 81 mg PO DAILY 11/08/19 


Atorvastatin Ca [Lipitor] 80 mg PO HS 11/08/19 


Buprenorphine/Naloxone [Suboxone 8Mg/2Mg Sl Film -] 1 each SL TID 11/08/19 


Fluoxetine HCl [Prozac -] 10 mg PO DAILY 11/08/19 


Gabapentin [Neurontin -] 100 mg PO BID 11/08/19 


Glipizide [Glipizide ER] 5 mg PO DAILY 11/08/19 


Insulin Glargine,Hum.rec.anlog [Basaglar Kwikpen U-100] 40 unit SQ DAILY 11/08/ 19 


Insulin NPH [Novolin N Vial -] 20 units SQ TID 11/08/19 


Metformin HCl [Glucophage] 1,000 mg PO DAILY 11/08/19 


Aripiprazole [Abilify -] 5 mg PO DAILY #30 tablet 11/11/19 


Aripiprazole [Abilify -] 5 mg PO DAILY #30 tablet 11/19/19 


Fluoxetine HCl [Prozac -] 10 mg PO DAILY #30 capsule 11/19/19 











- Discharge Instructions


Diet, activity, other medical instructions: 





Diet: as tolerated





Activity: as tolerated





Other medical instructions: Please follow up with aftercare referral and make 

an appointment with your PCP within 2 weeks.








- Diagnosis


(1) Alcohol dependence


Current Visit: Yes   Status: Acute   





(2) Cocaine dependence


Current Visit: Yes   Status: Acute   





(3) Cannabis use disorder, mild, abuse


Current Visit: No   Status: Chronic   





- Follow-up Referral


Minutes to complete discharge: 20





- AMA


Did Patient Leave Against Medical Advice: No

## 2019-11-20 VITALS — SYSTOLIC BLOOD PRESSURE: 109 MMHG | HEART RATE: 73 BPM | DIASTOLIC BLOOD PRESSURE: 65 MMHG | TEMPERATURE: 98.1 F

## 2019-11-20 RX ADMIN — GLIPIZIDE SCH MG: 5 TABLET, EXTENDED RELEASE ORAL at 06:47

## 2019-11-20 RX ADMIN — Medication SCH TAB: at 09:03

## 2019-11-20 RX ADMIN — INSULIN ASPART SCH UNITS: 100 INJECTION, SOLUTION INTRAVENOUS; SUBCUTANEOUS at 06:47

## 2019-11-20 RX ADMIN — ANALGESIC BALM SCH APPLIC: 1.74; 4.06 OINTMENT TOPICAL at 09:03

## 2019-11-20 RX ADMIN — FLUOXETINE SCH MG: 10 CAPSULE ORAL at 09:03

## 2019-11-20 RX ADMIN — ARIPIPRAZOLE SCH MG: 5 TABLET ORAL at 09:03

## 2019-11-20 RX ADMIN — METFORMIN HYDROCHLORIDE SCH MG: 500 TABLET ORAL at 06:47

## 2019-11-20 RX ADMIN — ASPIRIN 81 MG SCH MG: 81 TABLET ORAL at 09:03

## 2019-11-20 RX ADMIN — BUPRENORPHINE HYDROCHLORIDE, NALOXONE HYDROCHLORIDE SCH EACH: 8; 2 FILM, SOLUBLE BUCCAL; SUBLINGUAL at 09:04

## 2022-08-01 ENCOUNTER — HOSPITAL ENCOUNTER (INPATIENT)
Dept: HOSPITAL 74 - YASAS | Age: 57
LOS: 14 days | Discharge: HOME | DRG: 772 | End: 2022-08-15
Attending: PSYCHIATRY & NEUROLOGY | Admitting: ALLERGY & IMMUNOLOGY
Payer: COMMERCIAL

## 2022-08-01 VITALS — BODY MASS INDEX: 27.4 KG/M2

## 2022-08-01 DIAGNOSIS — F25.9: ICD-10-CM

## 2022-08-01 DIAGNOSIS — F14.20: Primary | ICD-10-CM

## 2022-08-01 DIAGNOSIS — F19.282: ICD-10-CM

## 2022-08-01 DIAGNOSIS — F17.210: ICD-10-CM

## 2022-08-01 DIAGNOSIS — F20.0: ICD-10-CM

## 2022-08-01 DIAGNOSIS — R76.11: ICD-10-CM

## 2022-08-01 DIAGNOSIS — G89.29: ICD-10-CM

## 2022-08-01 DIAGNOSIS — F19.280: ICD-10-CM

## 2022-08-01 DIAGNOSIS — M54.50: ICD-10-CM

## 2022-08-01 DIAGNOSIS — Z79.4: ICD-10-CM

## 2022-08-01 DIAGNOSIS — E78.5: ICD-10-CM

## 2022-08-01 DIAGNOSIS — F12.20: ICD-10-CM

## 2022-08-01 DIAGNOSIS — E11.9: ICD-10-CM

## 2022-08-01 LAB
ALBUMIN SERPL-MCNC: 3.4 G/DL (ref 3.4–5)
ALP SERPL-CCNC: 81 U/L (ref 45–117)
ALT SERPL-CCNC: 16 U/L (ref 13–61)
ANION GAP SERPL CALC-SCNC: 9 MMOL/L (ref 8–16)
AST SERPL-CCNC: 13 U/L (ref 15–37)
BILIRUB SERPL-MCNC: 0.5 MG/DL (ref 0.2–1)
BUN SERPL-MCNC: 15.4 MG/DL (ref 7–18)
CALCIUM SERPL-MCNC: 9.1 MG/DL (ref 8.5–10.1)
CHLORIDE SERPL-SCNC: 103 MMOL/L (ref 98–107)
CO2 SERPL-SCNC: 28 MMOL/L (ref 21–32)
CREAT SERPL-MCNC: 1.2 MG/DL (ref 0.55–1.3)
DEPRECATED RDW RBC AUTO: 13.8 % (ref 11.9–15.9)
GLUCOSE SERPL-MCNC: 199 MG/DL (ref 74–106)
HCT VFR BLD CALC: 41.2 % (ref 35.4–49)
HGB BLD-MCNC: 13.8 GM/DL (ref 11.7–16.9)
MCH RBC QN AUTO: 30.1 PG (ref 25.7–33.7)
MCHC RBC AUTO-ENTMCNC: 33.5 G/DL (ref 32–35.9)
MCV RBC: 89.9 FL (ref 80–96)
PLATELET # BLD AUTO: 218 10^3/UL (ref 134–434)
PMV BLD: 8.8 FL (ref 7.5–11.1)
PROT SERPL-MCNC: 6.9 G/DL (ref 6.4–8.2)
RBC # BLD AUTO: 4.58 M/MM3 (ref 4–5.6)
SODIUM SERPL-SCNC: 140 MMOL/L (ref 136–145)
TREPONEMA PALLIDUM AB [UNITS/VOLUME] IN SERUM OR PLASMA BY IMMUNOASSAY: (no result)
WBC # BLD AUTO: 9.3 K/MM3 (ref 4–10)

## 2022-08-01 PROCEDURE — U0003 INFECTIOUS AGENT DETECTION BY NUCLEIC ACID (DNA OR RNA); SEVERE ACUTE RESPIRATORY SYNDROME CORONAVIRUS 2 (SARS-COV-2) (CORONAVIRUS DISEASE [COVID-19]), AMPLIFIED PROBE TECHNIQUE, MAKING USE OF HIGH THROUGHPUT TECHNOLOGIES AS DESCRIBED BY CMS-2020-01-R: HCPCS

## 2022-08-01 PROCEDURE — U0005 INFEC AGEN DETEC AMPLI PROBE: HCPCS

## 2022-08-01 PROCEDURE — HZ42ZZZ GROUP COUNSELING FOR SUBSTANCE ABUSE TREATMENT, COGNITIVE-BEHAVIORAL: ICD-10-PCS | Performed by: PSYCHIATRY & NEUROLOGY

## 2022-08-01 RX ADMIN — Medication SCH MG: at 21:08

## 2022-08-01 RX ADMIN — HYDROXYZINE PAMOATE SCH: 25 CAPSULE ORAL at 21:08

## 2022-08-01 RX ADMIN — HYDROXYZINE PAMOATE SCH: 25 CAPSULE ORAL at 21:07

## 2022-08-01 RX ADMIN — HYDROXYZINE PAMOATE SCH MG: 25 CAPSULE ORAL at 21:08

## 2022-08-02 LAB
APPEARANCE UR: CLEAR
BACTERIA # UR AUTO: 5 /UL (ref 0–1359)
BILIRUB UR STRIP.AUTO-MCNC: NEGATIVE MG/DL
CASTS URNS QL MICRO: 0 /UL (ref 0–3.1)
COLOR UR: YELLOW
EPITH CASTS URNS QL MICRO: 4 /UL (ref 0–25.1)
KETONES UR QL STRIP: NEGATIVE
LEUKOCYTE ESTERASE UR QL STRIP.AUTO: NEGATIVE
NITRITE UR QL STRIP: NEGATIVE
PH UR: 5.5 [PH] (ref 5–8)
PROT UR QL STRIP: (no result)
PROT UR QL STRIP: NEGATIVE
RBC # BLD AUTO: 7 /UL (ref 0–23.9)
SP GR UR: 1.02 (ref 1.01–1.03)
UROBILINOGEN UR STRIP-MCNC: 0.2 MG/DL (ref 0.2–1)
WBC # UR AUTO: 4 /UL (ref 0–25.8)

## 2022-08-02 RX ADMIN — GLIPIZIDE SCH: 5 TABLET, EXTENDED RELEASE ORAL at 15:18

## 2022-08-02 RX ADMIN — HYDROXYZINE PAMOATE SCH MG: 25 CAPSULE ORAL at 10:08

## 2022-08-02 RX ADMIN — ASPIRIN 81 MG SCH MG: 81 TABLET ORAL at 10:08

## 2022-08-02 RX ADMIN — Medication SCH TAB: at 10:08

## 2022-08-02 RX ADMIN — INSULIN ASPART SCH UNITS: 100 INJECTION, SOLUTION INTRAVENOUS; SUBCUTANEOUS at 12:18

## 2022-08-02 RX ADMIN — HYDROXYZINE PAMOATE SCH MG: 25 CAPSULE ORAL at 06:40

## 2022-08-02 RX ADMIN — Medication SCH MG: at 21:29

## 2022-08-02 RX ADMIN — INSULIN ASPART SCH: 100 INJECTION, SOLUTION INTRAVENOUS; SUBCUTANEOUS at 17:21

## 2022-08-02 RX ADMIN — HYDROXYZINE PAMOATE SCH: 25 CAPSULE ORAL at 15:03

## 2022-08-02 RX ADMIN — NICOTINE SCH: 7 PATCH TRANSDERMAL at 10:08

## 2022-08-02 RX ADMIN — METFORMIN HYDROCHLORIDE SCH MG: 500 TABLET ORAL at 10:08

## 2022-08-02 RX ADMIN — Medication SCH MG: at 21:28

## 2022-08-02 RX ADMIN — INSULIN ASPART SCH UNITS: 100 INJECTION, SOLUTION INTRAVENOUS; SUBCUTANEOUS at 21:30

## 2022-08-02 RX ADMIN — MIRTAZAPINE SCH MG: 15 TABLET, FILM COATED ORAL at 21:29

## 2022-08-03 RX ADMIN — METFORMIN HYDROCHLORIDE SCH MG: 500 TABLET ORAL at 06:20

## 2022-08-03 RX ADMIN — ASPIRIN 81 MG SCH MG: 81 TABLET ORAL at 09:48

## 2022-08-03 RX ADMIN — INSULIN ASPART SCH: 100 INJECTION, SOLUTION INTRAVENOUS; SUBCUTANEOUS at 17:43

## 2022-08-03 RX ADMIN — GLIPIZIDE SCH MG: 5 TABLET, EXTENDED RELEASE ORAL at 11:07

## 2022-08-03 RX ADMIN — NICOTINE SCH: 7 PATCH TRANSDERMAL at 09:48

## 2022-08-03 RX ADMIN — Medication SCH MG: at 21:05

## 2022-08-03 RX ADMIN — Medication SCH MG: at 21:04

## 2022-08-03 RX ADMIN — INSULIN ASPART SCH: 100 INJECTION, SOLUTION INTRAVENOUS; SUBCUTANEOUS at 21:05

## 2022-08-03 RX ADMIN — INSULIN ASPART SCH: 100 INJECTION, SOLUTION INTRAVENOUS; SUBCUTANEOUS at 06:48

## 2022-08-03 RX ADMIN — HYDROXYZINE PAMOATE PRN MG: 25 CAPSULE ORAL at 06:20

## 2022-08-03 RX ADMIN — INSULIN ASPART SCH: 100 INJECTION, SOLUTION INTRAVENOUS; SUBCUTANEOUS at 10:55

## 2022-08-03 RX ADMIN — Medication SCH TAB: at 09:48

## 2022-08-03 RX ADMIN — MIRTAZAPINE SCH MG: 15 TABLET, FILM COATED ORAL at 21:04

## 2022-08-04 RX ADMIN — HYDROXYZINE PAMOATE PRN MG: 25 CAPSULE ORAL at 06:33

## 2022-08-04 RX ADMIN — INSULIN ASPART SCH: 100 INJECTION, SOLUTION INTRAVENOUS; SUBCUTANEOUS at 11:04

## 2022-08-04 RX ADMIN — GLIPIZIDE SCH MG: 5 TABLET, EXTENDED RELEASE ORAL at 10:03

## 2022-08-04 RX ADMIN — Medication SCH TAB: at 10:03

## 2022-08-04 RX ADMIN — INSULIN ASPART SCH: 100 INJECTION, SOLUTION INTRAVENOUS; SUBCUTANEOUS at 06:35

## 2022-08-04 RX ADMIN — METFORMIN HYDROCHLORIDE SCH MG: 500 TABLET ORAL at 06:33

## 2022-08-04 RX ADMIN — NICOTINE SCH: 7 PATCH TRANSDERMAL at 10:03

## 2022-08-04 RX ADMIN — Medication SCH MG: at 21:19

## 2022-08-04 RX ADMIN — INSULIN ASPART SCH UNITS: 100 INJECTION, SOLUTION INTRAVENOUS; SUBCUTANEOUS at 17:11

## 2022-08-04 RX ADMIN — MIRTAZAPINE SCH MG: 15 TABLET, FILM COATED ORAL at 21:19

## 2022-08-04 RX ADMIN — ASPIRIN 81 MG SCH MG: 81 TABLET ORAL at 10:03

## 2022-08-04 RX ADMIN — INSULIN ASPART SCH UNITS: 100 INJECTION, SOLUTION INTRAVENOUS; SUBCUTANEOUS at 21:18

## 2022-08-05 RX ADMIN — Medication SCH MG: at 21:31

## 2022-08-05 RX ADMIN — METFORMIN HYDROCHLORIDE SCH MG: 500 TABLET ORAL at 06:14

## 2022-08-05 RX ADMIN — GLIPIZIDE SCH MG: 5 TABLET, EXTENDED RELEASE ORAL at 11:00

## 2022-08-05 RX ADMIN — ASPIRIN 81 MG SCH MG: 81 TABLET ORAL at 09:23

## 2022-08-05 RX ADMIN — NICOTINE SCH: 7 PATCH TRANSDERMAL at 09:24

## 2022-08-05 RX ADMIN — INSULIN ASPART SCH UNITS: 100 INJECTION, SOLUTION INTRAVENOUS; SUBCUTANEOUS at 16:50

## 2022-08-05 RX ADMIN — INSULIN ASPART SCH: 100 INJECTION, SOLUTION INTRAVENOUS; SUBCUTANEOUS at 12:15

## 2022-08-05 RX ADMIN — INSULIN ASPART SCH: 100 INJECTION, SOLUTION INTRAVENOUS; SUBCUTANEOUS at 06:15

## 2022-08-05 RX ADMIN — MIRTAZAPINE SCH MG: 15 TABLET, FILM COATED ORAL at 21:31

## 2022-08-05 RX ADMIN — INSULIN ASPART SCH UNITS: 100 INJECTION, SOLUTION INTRAVENOUS; SUBCUTANEOUS at 21:33

## 2022-08-05 RX ADMIN — Medication SCH TAB: at 09:24

## 2022-08-06 RX ADMIN — METFORMIN HYDROCHLORIDE SCH MG: 500 TABLET ORAL at 06:10

## 2022-08-06 RX ADMIN — NICOTINE SCH: 7 PATCH TRANSDERMAL at 09:51

## 2022-08-06 RX ADMIN — INSULIN ASPART SCH UNITS: 100 INJECTION, SOLUTION INTRAVENOUS; SUBCUTANEOUS at 16:59

## 2022-08-06 RX ADMIN — INSULIN ASPART SCH: 100 INJECTION, SOLUTION INTRAVENOUS; SUBCUTANEOUS at 11:37

## 2022-08-06 RX ADMIN — GLIPIZIDE SCH MG: 5 TABLET, EXTENDED RELEASE ORAL at 06:09

## 2022-08-06 RX ADMIN — Medication SCH MG: at 21:02

## 2022-08-06 RX ADMIN — INSULIN ASPART SCH UNITS: 100 INJECTION, SOLUTION INTRAVENOUS; SUBCUTANEOUS at 21:03

## 2022-08-06 RX ADMIN — MIRTAZAPINE SCH MG: 15 TABLET, FILM COATED ORAL at 21:03

## 2022-08-06 RX ADMIN — Medication SCH TAB: at 09:51

## 2022-08-06 RX ADMIN — INSULIN ASPART SCH UNITS: 100 INJECTION, SOLUTION INTRAVENOUS; SUBCUTANEOUS at 06:11

## 2022-08-06 RX ADMIN — Medication SCH MG: at 21:03

## 2022-08-06 RX ADMIN — ASPIRIN 81 MG SCH MG: 81 TABLET ORAL at 09:51

## 2022-08-07 RX ADMIN — Medication SCH MG: at 21:09

## 2022-08-07 RX ADMIN — INSULIN ASPART SCH: 100 INJECTION, SOLUTION INTRAVENOUS; SUBCUTANEOUS at 06:21

## 2022-08-07 RX ADMIN — MIRTAZAPINE SCH MG: 15 TABLET, FILM COATED ORAL at 21:09

## 2022-08-07 RX ADMIN — ASPIRIN 81 MG SCH MG: 81 TABLET ORAL at 09:52

## 2022-08-07 RX ADMIN — Medication SCH TAB: at 09:52

## 2022-08-07 RX ADMIN — INSULIN ASPART SCH: 100 INJECTION, SOLUTION INTRAVENOUS; SUBCUTANEOUS at 11:39

## 2022-08-07 RX ADMIN — INSULIN ASPART SCH UNITS: 100 INJECTION, SOLUTION INTRAVENOUS; SUBCUTANEOUS at 21:09

## 2022-08-07 RX ADMIN — INSULIN ASPART SCH: 100 INJECTION, SOLUTION INTRAVENOUS; SUBCUTANEOUS at 17:30

## 2022-08-07 RX ADMIN — NICOTINE SCH: 7 PATCH TRANSDERMAL at 09:52

## 2022-08-07 RX ADMIN — METFORMIN HYDROCHLORIDE SCH MG: 500 TABLET ORAL at 06:20

## 2022-08-07 RX ADMIN — GLIPIZIDE SCH MG: 5 TABLET, EXTENDED RELEASE ORAL at 06:21

## 2022-08-08 RX ADMIN — Medication SCH MG: at 21:07

## 2022-08-08 RX ADMIN — Medication SCH TAB: at 10:20

## 2022-08-08 RX ADMIN — NICOTINE SCH: 7 PATCH TRANSDERMAL at 10:20

## 2022-08-08 RX ADMIN — ASPIRIN 81 MG SCH MG: 81 TABLET ORAL at 10:20

## 2022-08-08 RX ADMIN — INSULIN ASPART SCH UNITS: 100 INJECTION, SOLUTION INTRAVENOUS; SUBCUTANEOUS at 21:06

## 2022-08-08 RX ADMIN — METFORMIN HYDROCHLORIDE SCH MG: 500 TABLET ORAL at 06:18

## 2022-08-08 RX ADMIN — INSULIN ASPART SCH UNITS: 100 INJECTION, SOLUTION INTRAVENOUS; SUBCUTANEOUS at 16:44

## 2022-08-08 RX ADMIN — GLIPIZIDE SCH MG: 5 TABLET, EXTENDED RELEASE ORAL at 06:18

## 2022-08-08 RX ADMIN — INSULIN ASPART SCH: 100 INJECTION, SOLUTION INTRAVENOUS; SUBCUTANEOUS at 06:17

## 2022-08-08 RX ADMIN — MIRTAZAPINE SCH MG: 15 TABLET, FILM COATED ORAL at 21:07

## 2022-08-08 RX ADMIN — INSULIN ASPART SCH: 100 INJECTION, SOLUTION INTRAVENOUS; SUBCUTANEOUS at 12:03

## 2022-08-09 RX ADMIN — INSULIN ASPART SCH: 100 INJECTION, SOLUTION INTRAVENOUS; SUBCUTANEOUS at 06:46

## 2022-08-09 RX ADMIN — Medication SCH MG: at 21:24

## 2022-08-09 RX ADMIN — Medication SCH TAB: at 10:21

## 2022-08-09 RX ADMIN — INSULIN ASPART SCH UNITS: 100 INJECTION, SOLUTION INTRAVENOUS; SUBCUTANEOUS at 16:44

## 2022-08-09 RX ADMIN — INSULIN ASPART SCH UNITS: 100 INJECTION, SOLUTION INTRAVENOUS; SUBCUTANEOUS at 21:25

## 2022-08-09 RX ADMIN — METFORMIN HYDROCHLORIDE SCH MG: 500 TABLET ORAL at 06:42

## 2022-08-09 RX ADMIN — MIRTAZAPINE SCH MG: 15 TABLET, FILM COATED ORAL at 21:24

## 2022-08-09 RX ADMIN — ASPIRIN 81 MG SCH MG: 81 TABLET ORAL at 10:21

## 2022-08-09 RX ADMIN — NICOTINE SCH: 7 PATCH TRANSDERMAL at 10:21

## 2022-08-09 RX ADMIN — GLIPIZIDE SCH MG: 5 TABLET, EXTENDED RELEASE ORAL at 06:42

## 2022-08-09 RX ADMIN — INSULIN ASPART SCH: 100 INJECTION, SOLUTION INTRAVENOUS; SUBCUTANEOUS at 12:10

## 2022-08-10 RX ADMIN — INSULIN ASPART SCH: 100 INJECTION, SOLUTION INTRAVENOUS; SUBCUTANEOUS at 10:58

## 2022-08-10 RX ADMIN — INSULIN ASPART SCH: 100 INJECTION, SOLUTION INTRAVENOUS; SUBCUTANEOUS at 06:37

## 2022-08-10 RX ADMIN — Medication SCH MG: at 21:31

## 2022-08-10 RX ADMIN — NICOTINE SCH: 7 PATCH TRANSDERMAL at 10:26

## 2022-08-10 RX ADMIN — ASPIRIN 81 MG SCH MG: 81 TABLET ORAL at 10:25

## 2022-08-10 RX ADMIN — MIRTAZAPINE SCH MG: 15 TABLET, FILM COATED ORAL at 21:31

## 2022-08-10 RX ADMIN — Medication SCH TAB: at 10:25

## 2022-08-10 RX ADMIN — GLIPIZIDE SCH MG: 5 TABLET, EXTENDED RELEASE ORAL at 06:37

## 2022-08-10 RX ADMIN — INSULIN ASPART SCH: 100 INJECTION, SOLUTION INTRAVENOUS; SUBCUTANEOUS at 17:48

## 2022-08-10 RX ADMIN — METFORMIN HYDROCHLORIDE SCH MG: 500 TABLET ORAL at 06:37

## 2022-08-10 RX ADMIN — INSULIN ASPART SCH UNITS: 100 INJECTION, SOLUTION INTRAVENOUS; SUBCUTANEOUS at 21:33

## 2022-08-11 RX ADMIN — HYDROXYZINE PAMOATE PRN MG: 25 CAPSULE ORAL at 06:29

## 2022-08-11 RX ADMIN — INSULIN ASPART SCH: 100 INJECTION, SOLUTION INTRAVENOUS; SUBCUTANEOUS at 21:04

## 2022-08-11 RX ADMIN — GLIPIZIDE SCH MG: 5 TABLET, EXTENDED RELEASE ORAL at 06:37

## 2022-08-11 RX ADMIN — METFORMIN HYDROCHLORIDE SCH MG: 500 TABLET ORAL at 06:29

## 2022-08-11 RX ADMIN — IBUPROFEN PRN MG: 400 TABLET, FILM COATED ORAL at 19:01

## 2022-08-11 RX ADMIN — NICOTINE SCH: 7 PATCH TRANSDERMAL at 11:15

## 2022-08-11 RX ADMIN — Medication SCH: at 11:15

## 2022-08-11 RX ADMIN — Medication SCH MG: at 21:04

## 2022-08-11 RX ADMIN — ASPIRIN 81 MG SCH: 81 TABLET ORAL at 11:13

## 2022-08-11 RX ADMIN — INSULIN ASPART SCH UNITS: 100 INJECTION, SOLUTION INTRAVENOUS; SUBCUTANEOUS at 16:48

## 2022-08-11 RX ADMIN — MIRTAZAPINE SCH MG: 15 TABLET, FILM COATED ORAL at 21:03

## 2022-08-11 RX ADMIN — INSULIN ASPART SCH: 100 INJECTION, SOLUTION INTRAVENOUS; SUBCUTANEOUS at 11:42

## 2022-08-11 RX ADMIN — INSULIN ASPART SCH UNITS: 100 INJECTION, SOLUTION INTRAVENOUS; SUBCUTANEOUS at 06:30

## 2022-08-12 RX ADMIN — GLIPIZIDE SCH MG: 5 TABLET, EXTENDED RELEASE ORAL at 06:42

## 2022-08-12 RX ADMIN — INSULIN ASPART SCH UNITS: 100 INJECTION, SOLUTION INTRAVENOUS; SUBCUTANEOUS at 06:44

## 2022-08-12 RX ADMIN — IBUPROFEN PRN MG: 400 TABLET, FILM COATED ORAL at 16:54

## 2022-08-12 RX ADMIN — NICOTINE SCH: 7 PATCH TRANSDERMAL at 10:38

## 2022-08-12 RX ADMIN — INSULIN ASPART SCH: 100 INJECTION, SOLUTION INTRAVENOUS; SUBCUTANEOUS at 12:00

## 2022-08-12 RX ADMIN — HYDROXYZINE PAMOATE PRN MG: 25 CAPSULE ORAL at 21:16

## 2022-08-12 RX ADMIN — MIRTAZAPINE SCH MG: 15 TABLET, FILM COATED ORAL at 21:16

## 2022-08-12 RX ADMIN — METFORMIN HYDROCHLORIDE SCH MG: 500 TABLET ORAL at 06:43

## 2022-08-12 RX ADMIN — INSULIN ASPART SCH: 100 INJECTION, SOLUTION INTRAVENOUS; SUBCUTANEOUS at 16:55

## 2022-08-12 RX ADMIN — Medication SCH MG: at 21:16

## 2022-08-12 RX ADMIN — Medication SCH TAB: at 10:38

## 2022-08-12 RX ADMIN — ASPIRIN 81 MG SCH MG: 81 TABLET ORAL at 10:37

## 2022-08-12 RX ADMIN — INSULIN ASPART SCH UNITS: 100 INJECTION, SOLUTION INTRAVENOUS; SUBCUTANEOUS at 21:18

## 2022-08-13 RX ADMIN — GLIPIZIDE SCH MG: 5 TABLET, EXTENDED RELEASE ORAL at 06:24

## 2022-08-13 RX ADMIN — INSULIN ASPART SCH: 100 INJECTION, SOLUTION INTRAVENOUS; SUBCUTANEOUS at 06:24

## 2022-08-13 RX ADMIN — Medication SCH MG: at 21:12

## 2022-08-13 RX ADMIN — MIRTAZAPINE SCH MG: 15 TABLET, FILM COATED ORAL at 21:12

## 2022-08-13 RX ADMIN — HYDROXYZINE PAMOATE PRN MG: 25 CAPSULE ORAL at 21:13

## 2022-08-13 RX ADMIN — ASPIRIN 81 MG SCH MG: 81 TABLET ORAL at 10:46

## 2022-08-13 RX ADMIN — INSULIN ASPART SCH: 100 INJECTION, SOLUTION INTRAVENOUS; SUBCUTANEOUS at 21:13

## 2022-08-13 RX ADMIN — IBUPROFEN PRN MG: 400 TABLET, FILM COATED ORAL at 18:49

## 2022-08-13 RX ADMIN — NICOTINE SCH: 7 PATCH TRANSDERMAL at 10:47

## 2022-08-13 RX ADMIN — Medication SCH TAB: at 10:46

## 2022-08-13 RX ADMIN — INSULIN ASPART SCH UNITS: 100 INJECTION, SOLUTION INTRAVENOUS; SUBCUTANEOUS at 16:33

## 2022-08-13 RX ADMIN — METFORMIN HYDROCHLORIDE SCH MG: 500 TABLET ORAL at 06:24

## 2022-08-13 RX ADMIN — INSULIN ASPART SCH: 100 INJECTION, SOLUTION INTRAVENOUS; SUBCUTANEOUS at 13:05

## 2022-08-14 RX ADMIN — INSULIN ASPART SCH UNITS: 100 INJECTION, SOLUTION INTRAVENOUS; SUBCUTANEOUS at 22:01

## 2022-08-14 RX ADMIN — IBUPROFEN PRN MG: 400 TABLET, FILM COATED ORAL at 06:42

## 2022-08-14 RX ADMIN — Medication SCH TAB: at 09:56

## 2022-08-14 RX ADMIN — Medication SCH MG: at 21:16

## 2022-08-14 RX ADMIN — MIRTAZAPINE SCH MG: 15 TABLET, FILM COATED ORAL at 21:16

## 2022-08-14 RX ADMIN — INSULIN ASPART SCH: 100 INJECTION, SOLUTION INTRAVENOUS; SUBCUTANEOUS at 16:58

## 2022-08-14 RX ADMIN — METFORMIN HYDROCHLORIDE SCH MG: 500 TABLET ORAL at 06:41

## 2022-08-14 RX ADMIN — HYDROXYZINE PAMOATE PRN MG: 25 CAPSULE ORAL at 21:17

## 2022-08-14 RX ADMIN — INSULIN ASPART SCH UNITS: 100 INJECTION, SOLUTION INTRAVENOUS; SUBCUTANEOUS at 06:41

## 2022-08-14 RX ADMIN — NICOTINE SCH: 7 PATCH TRANSDERMAL at 09:57

## 2022-08-14 RX ADMIN — ASPIRIN 81 MG SCH MG: 81 TABLET ORAL at 09:56

## 2022-08-14 RX ADMIN — IBUPROFEN PRN MG: 400 TABLET, FILM COATED ORAL at 21:16

## 2022-08-14 RX ADMIN — INSULIN ASPART SCH: 100 INJECTION, SOLUTION INTRAVENOUS; SUBCUTANEOUS at 11:43

## 2022-08-14 RX ADMIN — GLIPIZIDE SCH MG: 5 TABLET, EXTENDED RELEASE ORAL at 06:41

## 2022-08-15 VITALS
HEART RATE: 80 BPM | TEMPERATURE: 97.5 F | SYSTOLIC BLOOD PRESSURE: 157 MMHG | DIASTOLIC BLOOD PRESSURE: 97 MMHG | RESPIRATION RATE: 16 BRPM

## 2022-08-15 RX ADMIN — INSULIN ASPART SCH: 100 INJECTION, SOLUTION INTRAVENOUS; SUBCUTANEOUS at 06:26

## 2022-08-15 RX ADMIN — ASPIRIN 81 MG SCH MG: 81 TABLET ORAL at 09:40

## 2022-08-15 RX ADMIN — HYDROXYZINE PAMOATE PRN MG: 25 CAPSULE ORAL at 06:21

## 2022-08-15 RX ADMIN — GLIPIZIDE SCH MG: 5 TABLET, EXTENDED RELEASE ORAL at 06:21

## 2022-08-15 RX ADMIN — METFORMIN HYDROCHLORIDE SCH MG: 500 TABLET ORAL at 06:21

## 2022-08-15 RX ADMIN — NICOTINE SCH: 7 PATCH TRANSDERMAL at 09:40

## 2022-08-15 RX ADMIN — Medication SCH TAB: at 09:40
